# Patient Record
Sex: MALE | HISPANIC OR LATINO | Employment: FULL TIME | URBAN - METROPOLITAN AREA
[De-identification: names, ages, dates, MRNs, and addresses within clinical notes are randomized per-mention and may not be internally consistent; named-entity substitution may affect disease eponyms.]

---

## 2021-06-02 ENCOUNTER — RECORDS - HEALTHEAST (OUTPATIENT)
Dept: ADMINISTRATIVE | Facility: CLINIC | Age: 51
End: 2021-06-02

## 2021-07-27 ENCOUNTER — HOSPITAL ENCOUNTER (EMERGENCY)
Facility: HOSPITAL | Age: 51
Discharge: HOME | End: 2021-07-27
Attending: EMERGENCY MEDICINE | Admitting: EMERGENCY MEDICINE

## 2021-07-27 VITALS
SYSTOLIC BLOOD PRESSURE: 140 MMHG | OXYGEN SATURATION: 97 % | WEIGHT: 250 LBS | TEMPERATURE: 97.8 F | DIASTOLIC BLOOD PRESSURE: 86 MMHG | HEART RATE: 55 BPM | RESPIRATION RATE: 20 BRPM

## 2021-07-27 DIAGNOSIS — H81.11 BENIGN PAROXYSMAL POSITIONAL VERTIGO, RIGHT: ICD-10-CM

## 2021-07-27 PROBLEM — Z99.89 CPAP (CONTINUOUS POSITIVE AIRWAY PRESSURE) DEPENDENCE: Status: ACTIVE | Noted: 2018-03-01

## 2021-07-27 PROBLEM — E55.9 VITAMIN D DEFICIENCY: Status: ACTIVE | Noted: 2018-03-07

## 2021-07-27 LAB
ANION GAP SERPL CALCULATED.3IONS-SCNC: 9 MMOL/L (ref 5–18)
BASOPHILS # BLD AUTO: 0.1 10E3/UL (ref 0–0.2)
BASOPHILS NFR BLD AUTO: 0 %
BUN SERPL-MCNC: 10 MG/DL (ref 8–22)
CALCIUM SERPL-MCNC: 9.6 MG/DL (ref 8.5–10.5)
CHLORIDE BLD-SCNC: 106 MMOL/L (ref 98–107)
CO2 SERPL-SCNC: 25 MMOL/L (ref 22–31)
CREAT SERPL-MCNC: 0.8 MG/DL (ref 0.7–1.3)
EOSINOPHIL # BLD AUTO: 0.2 10E3/UL (ref 0–0.7)
EOSINOPHIL NFR BLD AUTO: 2 %
ERYTHROCYTE [DISTWIDTH] IN BLOOD BY AUTOMATED COUNT: 14 % (ref 10–15)
GFR SERPL CREATININE-BSD FRML MDRD: >90 ML/MIN/1.73M2
GLUCOSE BLD-MCNC: 90 MG/DL (ref 70–125)
HCT VFR BLD AUTO: 44.8 % (ref 40–53)
HGB BLD-MCNC: 15.2 G/DL (ref 13.3–17.7)
IMM GRANULOCYTES # BLD: 0.1 10E3/UL
IMM GRANULOCYTES NFR BLD: 1 %
LYMPHOCYTES # BLD AUTO: 3 10E3/UL (ref 0.8–5.3)
LYMPHOCYTES NFR BLD AUTO: 24 %
MCH RBC QN AUTO: 29.2 PG (ref 26.5–33)
MCHC RBC AUTO-ENTMCNC: 33.9 G/DL (ref 31.5–36.5)
MCV RBC AUTO: 86 FL (ref 78–100)
MONOCYTES # BLD AUTO: 0.8 10E3/UL (ref 0–1.3)
MONOCYTES NFR BLD AUTO: 7 %
NEUTROPHILS # BLD AUTO: 8.5 10E3/UL (ref 1.6–8.3)
NEUTROPHILS NFR BLD AUTO: 66 %
NRBC # BLD AUTO: 0 10E3/UL
NRBC BLD AUTO-RTO: 0 /100
PLATELET # BLD AUTO: 301 10E3/UL (ref 150–450)
POTASSIUM BLD-SCNC: 4.1 MMOL/L (ref 3.5–5)
RBC # BLD AUTO: 5.21 10E6/UL (ref 4.4–5.9)
SODIUM SERPL-SCNC: 140 MMOL/L (ref 136–145)
WBC # BLD AUTO: 12.5 10E3/UL (ref 4–11)

## 2021-07-27 PROCEDURE — 80048 BASIC METABOLIC PNL TOTAL CA: CPT | Performed by: EMERGENCY MEDICINE

## 2021-07-27 PROCEDURE — 99283 EMERGENCY DEPT VISIT LOW MDM: CPT

## 2021-07-27 PROCEDURE — 36415 COLL VENOUS BLD VENIPUNCTURE: CPT | Performed by: EMERGENCY MEDICINE

## 2021-07-27 PROCEDURE — 85025 COMPLETE CBC W/AUTO DIFF WBC: CPT | Performed by: EMERGENCY MEDICINE

## 2021-07-27 RX ORDER — MECLIZINE HCL 12.5 MG 12.5 MG/1
25 TABLET ORAL 4 TIMES DAILY PRN
Qty: 30 TABLET | Refills: 0 | Status: SHIPPED | OUTPATIENT
Start: 2021-07-27 | End: 2023-06-05

## 2021-07-27 NOTE — ED TRIAGE NOTES
Pt states history of dizziness that started 2 days ago.  Pt states he feels the room will start spinning.  Pt was seen at his primary and was told to go to ER for further evaluation.  Pt states when he tilts to the right side the dizziness gets worse.

## 2021-07-28 NOTE — DISCHARGE INSTRUCTIONS
Cleansing as needed for vertigo.  Call your primary care doctor tomorrow and have them refer you to outpatient physical therapy to learn Epley maneuver.  If you do not have a primary care doctor referral has been provided.

## 2021-07-28 NOTE — ED PROVIDER NOTES
EMERGENCY DEPARTMENT ENCOUNTER      NAME: Ramin Matute  AGE: 51 year old male  YOB: 1970  MRN: [id number]  EVALUATION DATE & TIME: 2021  7:08 PM    PCP: No primary care provider on file.    ED PROVIDER: Flaquita Humphrey MD    Chief Complaint   Patient presents with     Dizziness         FINAL IMPRESSION:  1. Benign paroxysmal positional vertigo, right          ED COURSE & MEDICAL DECISION MAKIN:16 PM Met with patient and preformed initial assessment.   7:22 PM Discussed discharge with patient.     Pertinent Labs & Imaging studies reviewed. (See chart for details)  51 year old male with history of depression, HTN, obesity who presents to the Emergency Department for evaluation of episodes of vertigo when he turns his head to the right or leans to the right since yesterday morning.  Associated nausea vomiting.  Had a similar episode 2021 that was self-limited.  He has absolutely no neurologic symptoms associated with this.  Neurologic examination is unremarkable and patient is normal.  Patient does not warrant neuroimaging or further evaluation for CVA, TIA, posterior circulation mass lesion, etc.  His symptoms are fairly classic for BPPV.  Patient given prescription for meclizine, and discharged home with PCP and physical therapy for Epley maneuver teaching.               At the conclusion of the encounter I discussed the results of all of the tests and the disposition. The questions were answered. The patient or family acknowledged understanding and was agreeable with the care plan.    MEDICATIONS GIVEN IN THE EMERGENCY:  Medications - No data to display    NEW PRESCRIPTIONS STARTED AT TODAY'S ER VISIT  Discharge Medication List as of 2021  7:59 PM      START taking these medications    Details   meclizine (ANTIVERT) 12.5 MG tablet Take 2 tablets (25 mg) by mouth 4 times daily as needed for dizziness, Disp-30 tablet, R-0, Local Print                 =================================================================    HPI    Patient information was obtained from: Patient    Use of Intrepreter: N/A       Ramin Matute is a 51 year old male with pertinent medical history of obesity and high blood pressure who presents dizziness.     Since yesterday morning, patient reports having persistent intermittent episodes of dizziness that feels like the world is moving back and forth. Patient states that episodes are onset when bending over to the right or when he looks suddenly to the right. He mentions that episodes initially lasted 30 seconds, but episode prior to ED arrival lasted 30 minutes. Patient mentions having slight right ear ringing without pain. He mentions having multiple episodes of vomiting secondary to dizziness. Patient reports taking medications for depression and high blood pressure. He denies numbness, tingling, weakness, any other pains, or any other complaints at this time.       REVIEW OF SYSTEMS  Constitutional:  Denies fever, chills, weight loss or weakness  Eyes:  No pain, discharge, redness  HENT: Positive right ear ringing. Denies sore throat, ear pain, congestion  GI:  Positive vomiting. Denies abdominal pain, diarrhea  Musculoskeletal:  Denies any new muscle/joint pain, swelling or loss of function.  Neurologic:  Positive room spinning dizziness. Denies headaches  All other systems negative unless noted in HPI.      PAST MEDICAL HISTORY:  Past Medical History:   Diagnosis Date     Depressive disorder      Hypertension      Obesity        PAST SURGICAL HISTORY:  History reviewed. No pertinent surgical history.    CURRENT MEDICATIONS:    None       ALLERGIES:  No Known Allergies    FAMILY HISTORY:  History reviewed. No pertinent family history.    SOCIAL HISTORY:  Social History     Tobacco Use     Smoking status: None   Substance Use Topics     Alcohol use: None     Drug use: None        VITALS:  Patient Vitals for the past 24 hrs:   BP  Temp Temp src Pulse Resp SpO2 Weight   07/27/21 1741 (!) 140/86 97.8  F (36.6  C) Temporal 55 20 97 % 113.4 kg (250 lb)       PHYSICAL EXAM    General Appearance: Well-appearing, well-nourished, no acute distress  Head:  Normocephalic  Eyes:  PERRL, conjunctiva/corneas clear, EOM's intact  ENT: TMs clear bilaterally membranes are moist without pallor  Neck:  Supple  Cardio:  Regular rate and rhythm  Pulm:  No respiratory distress  Abdomen: Morbidly obese  Extremities: Moves all extremities normally, normal gait  Skin:  Skin warm, dry, no rashes  Neuro:  Alert and oriented ×3, moving all extremities, no gross sensory defects. Cranial nerves 3-12 intact. 5/5 strength in upper and lower extremities.      RADIOLOGY/LABS:  Reviewed all pertinent imaging. Please see official radiology report. All pertinent labs reviewed and interpreted.    Results for orders placed or performed during the hospital encounter of 07/27/21   Basic metabolic panel   Result Value Ref Range    Sodium 140 136 - 145 mmol/L    Potassium 4.1 3.5 - 5.0 mmol/L    Chloride 106 98 - 107 mmol/L    Carbon Dioxide (CO2) 25 22 - 31 mmol/L    Anion Gap 9 5 - 18 mmol/L    Urea Nitrogen 10 8 - 22 mg/dL    Creatinine 0.80 0.70 - 1.30 mg/dL    Calcium 9.6 8.5 - 10.5 mg/dL    Glucose 90 70 - 125 mg/dL    GFR Estimate >90 >60 mL/min/1.73m2   CBC with platelets and differential   Result Value Ref Range    WBC Count 12.5 (H) 4.0 - 11.0 10e3/uL    RBC Count 5.21 4.40 - 5.90 10e6/uL    Hemoglobin 15.2 13.3 - 17.7 g/dL    Hematocrit 44.8 40.0 - 53.0 %    MCV 86 78 - 100 fL    MCH 29.2 26.5 - 33.0 pg    MCHC 33.9 31.5 - 36.5 g/dL    RDW 14.0 10.0 - 15.0 %    Platelet Count 301 150 - 450 10e3/uL    % Neutrophils 66 %    % Lymphocytes 24 %    % Monocytes 7 %    % Eosinophils 2 %    % Basophils 0 %    % Immature Granulocytes 1 %    NRBCs per 100 WBC 0 <1 /100    Absolute Neutrophils 8.5 (H) 1.6 - 8.3 10e3/uL    Absolute Lymphocytes 3.0 0.8 - 5.3 10e3/uL    Absolute  Monocytes 0.8 0.0 - 1.3 10e3/uL    Absolute Eosinophils 0.2 0.0 - 0.7 10e3/uL    Absolute Basophils 0.1 0.0 - 0.2 10e3/uL    Absolute Immature Granulocytes 0.1 (H) <=0.0 10e3/uL    Absolute NRBCs 0.0 10e3/uL       The creation of this record is based on the scribe s observations of the work being performed by Flaquita Humphrey MD and the provider s statements to them. It was created on his behalf by Washington Johnson, a trained medical scribe. This document has been checked and approved by the attending provider.    Flaquita Humphrey MD  Emergency Medicine  [facility] EMERGENCY DEPARTMENT  [facility] [facility] [id number] [phone]  Dept: [phone]       Flaquita Humphrey MD  07/27/21 9574

## 2023-06-05 ENCOUNTER — APPOINTMENT (OUTPATIENT)
Dept: MRI IMAGING | Facility: CLINIC | Age: 53
End: 2023-06-05
Attending: NURSE PRACTITIONER
Payer: COMMERCIAL

## 2023-06-05 ENCOUNTER — HOSPITAL ENCOUNTER (EMERGENCY)
Facility: CLINIC | Age: 53
Discharge: HOME | End: 2023-06-05
Attending: EMERGENCY MEDICINE | Admitting: EMERGENCY MEDICINE
Payer: COMMERCIAL

## 2023-06-05 ENCOUNTER — APPOINTMENT (OUTPATIENT)
Dept: CT IMAGING | Facility: CLINIC | Age: 53
End: 2023-06-05
Attending: EMERGENCY MEDICINE
Payer: COMMERCIAL

## 2023-06-05 VITALS
RESPIRATION RATE: 16 BRPM | WEIGHT: 256 LBS | BODY MASS INDEX: 42.65 KG/M2 | OXYGEN SATURATION: 97 % | DIASTOLIC BLOOD PRESSURE: 87 MMHG | SYSTOLIC BLOOD PRESSURE: 167 MMHG | HEART RATE: 98 BPM | HEIGHT: 65 IN | TEMPERATURE: 97.5 F

## 2023-06-05 DIAGNOSIS — H81.399 PERIPHERAL VERTIGO, UNSPECIFIED LATERALITY: ICD-10-CM

## 2023-06-05 LAB
ANION GAP SERPL CALCULATED.3IONS-SCNC: 8 MMOL/L (ref 5–18)
APTT PPP: 24 SECONDS (ref 22–38)
ATRIAL RATE - MUSE: 69 BPM
BASOPHILS # BLD AUTO: 0.1 10E3/UL (ref 0–0.2)
BASOPHILS NFR BLD AUTO: 0 %
BUN SERPL-MCNC: 12 MG/DL (ref 8–22)
CALCIUM SERPL-MCNC: 9.3 MG/DL (ref 8.5–10.5)
CHLORIDE BLD-SCNC: 101 MMOL/L (ref 98–107)
CO2 SERPL-SCNC: 30 MMOL/L (ref 22–31)
CREAT SERPL-MCNC: 0.86 MG/DL (ref 0.7–1.3)
DIASTOLIC BLOOD PRESSURE - MUSE: NORMAL MMHG
EOSINOPHIL # BLD AUTO: 0.2 10E3/UL (ref 0–0.7)
EOSINOPHIL NFR BLD AUTO: 2 %
ERYTHROCYTE [DISTWIDTH] IN BLOOD BY AUTOMATED COUNT: 14.2 % (ref 10–15)
GFR SERPL CREATININE-BSD FRML MDRD: >90 ML/MIN/1.73M2
GLUCOSE BLD-MCNC: 132 MG/DL (ref 70–125)
GLUCOSE BLDC GLUCOMTR-MCNC: 118 MG/DL (ref 70–99)
HCT VFR BLD AUTO: 42.3 % (ref 40–53)
HGB BLD-MCNC: 14.2 G/DL (ref 13.3–17.7)
IMM GRANULOCYTES # BLD: 0.1 10E3/UL
IMM GRANULOCYTES NFR BLD: 1 %
INR PPP: 1.04 (ref 0.85–1.15)
INTERPRETATION ECG - MUSE: NORMAL
LYMPHOCYTES # BLD AUTO: 2.6 10E3/UL (ref 0.8–5.3)
LYMPHOCYTES NFR BLD AUTO: 23 %
MCH RBC QN AUTO: 28.7 PG (ref 26.5–33)
MCHC RBC AUTO-ENTMCNC: 33.6 G/DL (ref 31.5–36.5)
MCV RBC AUTO: 86 FL (ref 78–100)
MONOCYTES # BLD AUTO: 0.7 10E3/UL (ref 0–1.3)
MONOCYTES NFR BLD AUTO: 6 %
NEUTROPHILS # BLD AUTO: 7.9 10E3/UL (ref 1.6–8.3)
NEUTROPHILS NFR BLD AUTO: 68 %
NRBC # BLD AUTO: 0 10E3/UL
NRBC BLD AUTO-RTO: 0 /100
P AXIS - MUSE: 49 DEGREES
PLATELET # BLD AUTO: 263 10E3/UL (ref 150–450)
POTASSIUM BLD-SCNC: 3.7 MMOL/L (ref 3.5–5)
PR INTERVAL - MUSE: 152 MS
QRS DURATION - MUSE: 104 MS
QT - MUSE: 412 MS
QTC - MUSE: 441 MS
R AXIS - MUSE: 17 DEGREES
RBC # BLD AUTO: 4.95 10E6/UL (ref 4.4–5.9)
SODIUM SERPL-SCNC: 139 MMOL/L (ref 136–145)
SYSTOLIC BLOOD PRESSURE - MUSE: NORMAL MMHG
T AXIS - MUSE: 39 DEGREES
TROPONIN I SERPL-MCNC: 0.02 NG/ML (ref 0–0.29)
VENTRICULAR RATE- MUSE: 69 BPM
WBC # BLD AUTO: 11.5 10E3/UL (ref 4–11)

## 2023-06-05 PROCEDURE — 70496 CT ANGIOGRAPHY HEAD: CPT

## 2023-06-05 PROCEDURE — 82962 GLUCOSE BLOOD TEST: CPT

## 2023-06-05 PROCEDURE — 70498 CT ANGIOGRAPHY NECK: CPT

## 2023-06-05 PROCEDURE — 85730 THROMBOPLASTIN TIME PARTIAL: CPT | Performed by: EMERGENCY MEDICINE

## 2023-06-05 PROCEDURE — 36415 COLL VENOUS BLD VENIPUNCTURE: CPT | Performed by: EMERGENCY MEDICINE

## 2023-06-05 PROCEDURE — 258N000003 HC RX IP 258 OP 636: Performed by: EMERGENCY MEDICINE

## 2023-06-05 PROCEDURE — 85610 PROTHROMBIN TIME: CPT | Performed by: EMERGENCY MEDICINE

## 2023-06-05 PROCEDURE — 84484 ASSAY OF TROPONIN QUANT: CPT | Performed by: EMERGENCY MEDICINE

## 2023-06-05 PROCEDURE — 93005 ELECTROCARDIOGRAM TRACING: CPT | Performed by: EMERGENCY MEDICINE

## 2023-06-05 PROCEDURE — 250N000011 HC RX IP 250 OP 636: Performed by: EMERGENCY MEDICINE

## 2023-06-05 PROCEDURE — 80048 BASIC METABOLIC PNL TOTAL CA: CPT | Performed by: EMERGENCY MEDICINE

## 2023-06-05 PROCEDURE — 70553 MRI BRAIN STEM W/O & W/DYE: CPT

## 2023-06-05 PROCEDURE — 255N000002 HC RX 255 OP 636: Performed by: EMERGENCY MEDICINE

## 2023-06-05 PROCEDURE — 96360 HYDRATION IV INFUSION INIT: CPT | Mod: 59 | Performed by: EMERGENCY MEDICINE

## 2023-06-05 PROCEDURE — 70450 CT HEAD/BRAIN W/O DYE: CPT

## 2023-06-05 PROCEDURE — 99285 EMERGENCY DEPT VISIT HI MDM: CPT | Mod: 25 | Performed by: EMERGENCY MEDICINE

## 2023-06-05 PROCEDURE — 96361 HYDRATE IV INFUSION ADD-ON: CPT | Performed by: EMERGENCY MEDICINE

## 2023-06-05 PROCEDURE — A9585 GADOBUTROL INJECTION: HCPCS | Performed by: EMERGENCY MEDICINE

## 2023-06-05 PROCEDURE — 85025 COMPLETE CBC W/AUTO DIFF WBC: CPT | Performed by: EMERGENCY MEDICINE

## 2023-06-05 RX ORDER — PROCHLORPERAZINE MALEATE 10 MG
5 TABLET ORAL EVERY 6 HOURS PRN
Qty: 10 TABLET | Refills: 0 | Status: SHIPPED | OUTPATIENT
Start: 2023-06-05

## 2023-06-05 RX ORDER — SODIUM CHLORIDE 9 MG/ML
INJECTION, SOLUTION INTRAVENOUS CONTINUOUS
Status: DISCONTINUED | OUTPATIENT
Start: 2023-06-05 | End: 2023-06-05 | Stop reason: HOSPADM

## 2023-06-05 RX ORDER — IOPAMIDOL 755 MG/ML
100 INJECTION, SOLUTION INTRAVASCULAR ONCE
Status: COMPLETED | OUTPATIENT
Start: 2023-06-05 | End: 2023-06-05

## 2023-06-05 RX ORDER — MECLIZINE HYDROCHLORIDE 25 MG/1
25 TABLET ORAL 3 TIMES DAILY PRN
Qty: 15 TABLET | Refills: 0 | Status: SHIPPED | OUTPATIENT
Start: 2023-06-05

## 2023-06-05 RX ORDER — GADOBUTROL 604.72 MG/ML
10 INJECTION INTRAVENOUS ONCE
Status: COMPLETED | OUTPATIENT
Start: 2023-06-05 | End: 2023-06-05

## 2023-06-05 RX ADMIN — GADOBUTROL 10 ML: 604.72 INJECTION INTRAVENOUS at 14:36

## 2023-06-05 RX ADMIN — SODIUM CHLORIDE: 9 INJECTION, SOLUTION INTRAVENOUS at 15:06

## 2023-06-05 RX ADMIN — IOPAMIDOL 75 ML: 755 INJECTION, SOLUTION INTRAVENOUS at 12:57

## 2023-06-05 RX ADMIN — SODIUM CHLORIDE 1000 ML: 9 INJECTION, SOLUTION INTRAVENOUS at 13:11

## 2023-06-05 ASSESSMENT — ACTIVITIES OF DAILY LIVING (ADL)
ADLS_ACUITY_SCORE: 33
ADLS_ACUITY_SCORE: 35

## 2023-06-05 NOTE — ED NOTES
Stroke code deescalated. Primary RN updated. Pt did endorse right ear pain for the last few months.

## 2023-06-05 NOTE — ED TRIAGE NOTES
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 Karen MD assessing pt in triage.  Angella Hughes RN.......6/5/2023 12:47 PM     Triage Assessment     Row Name 06/05/23 1245       Triage Assessment (Adult)    Airway WDL WDL       Respiratory WDL    Respiratory WDL WDL       Skin Circulation/Temperature WDL    Skin Circulation/Temperature WDL WDL       Cardiac WDL    Cardiac WDL WDL       Peripheral/Neurovascular WDL    Peripheral Neurovascular WDL WDL       Cognitive/Neuro/Behavioral WDL    Cognitive/Neuro/Behavioral WDL WDL

## 2023-06-05 NOTE — CONSULTS
"[facility]    Stroke Consult Note    Reason for Consult: Stroke Code     Chief Complaint: Dizziness and Nausea      HPI  Ramin Matute is a 52 year old male with PMHx significant for HTN and GINA on CPAP. He presents via medics with sudden onset vertigo at 1030 with associated nausea and vomiting. He reports that he had similar symptoms a couple of years ago when he had COVID. He feels generally well but tired; when asked specifically about HEENT symptoms he reports that he's had ~ 1 month of right ear fullness and occasional pain.     After CT, his symptoms improved significantly but haven't resolved. Slight increase in symptoms with movement. He was able to ambulate. He denies headache.     Imaging Findings  Head CT:   No acute intracranial process.    HEAD CTA:   1.  No large artery stenosis or occlusion. No aneurysm.     NECK CTA:  1.  No measurable carotid or vertebral artery stenosis.    Intravenous Thrombolysis  Not given due to:   - minor/isolated/quickly resolving symptoms    Endovascular Treatment  Not initiated due to absence of proximal vessel occlusion    Impression   Vertigo: peripheral etiology vs stroke     Recommendations  - MRI brain w/ and w/out contrast (ordered)  - can allow for permissive hypertension while awaiting MRI results   - can give aspirin 325 mg x 1  - if MRI shows stroke, please place IP stroke consult     Patient Follow-up     - final recommendation pending work-up    Thank you for this consult. We will follow for MRI results.      Arlen Mendoza NP  Vascular Neurology    To page me or covering stroke neurology team member, click here: [facility]  Choose \"On Call\" tab at top, then select \"NEUROLOGY/ALL SITES\" from middle drop-down box, press Enter, then look for \"stroke\" or \"telestroke\" for your site.    ______________________________________________________    Clinically Significant Risk Factors Present on Admission                       # Severe Obesity: " "Estimated body mass index is 42.6 kg/m  as calculated from the following:    Height as of this encounter: 1.651 m (5' 5\").    Weight as of this encounter: 116.1 kg (256 lb).              Past Medical History   Past Medical History:   Diagnosis Date     Depressive disorder      Hypertension      Obesity      Past Surgical History   No past surgical history on file.  Medications   Home Meds  Prior to Admission medications    Medication Sig Start Date End Date Taking? Authorizing Provider   meclizine (ANTIVERT) 12.5 MG tablet Take 2 tablets (25 mg) by mouth 4 times daily as needed for dizziness 7/27/21   Flaquita Humphrey MD       Scheduled Meds    sodium chloride 0.9%  1,000 mL Intravenous Once       Infusion Meds    sodium chloride         PRN Meds      Allergies   No Known Allergies  Family History   No family history on file.  Social History        Review of Systems   The 10 point Review of Systems is negative other than noted in the HPI or here.        PHYSICAL EXAMINATION  Temp:  [97.5  F (36.4  C)] 97.5  F (36.4  C)  Pulse:  [63] 63  Resp:  [18] 18  BP: (186)/(89) 186/89  SpO2:  [100 %] 100 %     Neuro Exam  Mental Status:  alert, oriented x 3, follows commands, speech clear and fluent, naming and repetition normal  Cranial Nerves:  visual fields intact (tested by nurse), EOMI with normal smooth pursuit, facial sensation intact and symmetric (tested by nurse), facial movements symmetric, hearing not formally tested but intact to conversation, no dysarthria, shoulder shrug equal bilaterally, tongue protrusion midline  Motor:  no abnormal movements, able to move all limbs antigravity spontaneously with no signs of hemiparesis observed, no pronator drift  Reflexes:  unable to test (telestroke)  Sensory:  light touch sensation intact and symmetric throughout upper and lower extremities (assessed by nurse), no extinction on double simultaneous stimulation (assessed by nurse)  Coordination:  normal finger-to-nose and " heel-to-shin bilaterally without dysmetria  Station/Gait:  normal width, turn, arm swing    Dysphagia Screen  Per Nursing    Stroke Scales    NIHSS  1a. Level of Consciousness 0-->Alert, keenly responsive   1b. LOC Questions 0-->Answers both questions correctly   1c. LOC Commands 0-->Performs both tasks correctly   2.   Best Gaze 0-->Normal   3.   Visual 0-->No visual loss   4.   Facial Palsy 0-->Normal symmetrical movements   5a. Motor Arm, Left 0-->No drift, limb holds 90 (or 45) degrees for full 10 secs   5b. Motor Arm, Right 0-->No drift, limb holds 90 (or 45) degrees for full 10 secs   6a. Motor Leg, Left 0-->No drift, leg holds 30 degree position for full 5 secs   6b. Motor Leg, right 0-->No drift, leg holds 30 degree position for full 5 secs   7.   Limb Ataxia 0-->Absent   8.   Sensory 0-->Normal, no sensory loss   9.   Best Language 0-->No aphasia, normal   10. Dysarthria 0-->Normal   11. Extinction and Inattention  0-->No abnormality   Total 0 (06/05/23 1316)       Modified [place] Score (Pre-morbid)  0 - No symptoms.    Imaging  I personally reviewed all imaging; relevant findings per HPI.     Lab Results Data   CBC  No results for input(s): WBC, RBC, HGB, HCT, PLT in the last 168 hours.  Basic Metabolic Panel    No results for input(s): NA, POTASSIUM, CHLORIDE, CO2, BUN, CR, GLC, SHAYNA in the last 168 hours.  Liver Panel  No results for input(s): PROTTOTAL, ALBUMIN, BILITOTAL, ALKPHOS, AST, ALT, BILIDIRECT in the last 168 hours.  INR  No lab results found.   Lipid Profile  No lab results found.  A1C  No lab results found.  Troponin  No results for input(s): CTROPT, TROPONINIS, TROPONINI, GHTROP in the last 168 hours.       Stroke Code Data Data   Stroke Code Data  (for stroke code with tele)  Stroke code activated 06/05/23   1245   First stroke provider response 06/05/23   1249   Video start time 06/05/23   1310   Video end time 06/05/23   1322   Last known normal 06/05/23   1030   Time of discovery  (or  onset of symptoms)  06/05/23   1030   Head CT read by Stroke Neuro Dr/Provider 06/05/23   1309   Was stroke code de-escalated? Yes 06/05/23 1323           Telestroke Service Details  Type of service telemedicine diagnostic assessment of acute neurological changes   Reason telemedicine is appropriate patient requires assessment with a specialist for diagnosis and treatment of neurological symptoms   Mode of transmission secure interactive audio and video communication per [employer]   Originating site (patient location) [facility]    Distant site (provider location) [facility]       I personally examined and evaluated the patient today. At the time of my evaluation and management the patient was in critical condition today due to acute neurological changes. I personally managed review of labs and images and tele neuro exam. Key decisions made today included: MRI. I spent a total of 40 minutes providing critical care services, evaluating the patient, directing care and reviewing laboratory values and radiologic reports.

## 2023-06-05 NOTE — ED PROVIDER NOTES
EMERGENCY DEPARTMENT ENCOUNTER            IMPRESSION:  Peripheral vertigo        MEDICAL DECISION MAKING:  It was my pleasure to provide care for Ramin Matute who presented by ambulance for for evaluation of dizziness which she further characterized as vertigo.  No chest pain or other acute neurologic symptoms.    On my exam patient is pleasant and cooperative.  No evidence of distress.  Vital signs show slightly elevated blood pressure otherwise normal.  Physical exam notable for no acute neurologic findings..     IV fluid administered for symptom relief.  Patient's symptoms improved.     EKG normal sinus rhythm without ischemia or arrhythmia    Significant laboratory findings include normal chemistry and CBC    CT imaging of the head is normal.  Imaging independently reviewed and agree with radiologist findings.     ED evaluation is consistent with peripheral vertigo.    I spoke with on-call stroke neurology.  Stroke code de-escalated.  MRI of the brain recommended.      Patient was reevaluated and results were discussed.       Prior to making a final disposition on this patient the results of patient's tests and other diagnostic studies were discussed with the patient. All questions were answered. Patient expressed understanding of the plan and was amenable.   Return precautions and follow-up were discussed.     =================================================================  CHIEF COMPLAINT:  Chief Complaint   Patient presents with     Dizziness     Nausea         HPI  Ramin Matute is a 52 year old male with a history of CPAP dependence who presents to the ED by EMS for evaluation of nausea and dizziness    While dropping off some paint supplies today the patient endorsed the sudden onset of room spinning dizziness at approximately 10:30 AM. He also endorses nausea and one episode of vomiting from the dizziness. The patient is currently diaphoretic and feeling fatigued. He reports that he had breakfast this  "morning as usual.      The patient denies any known medical problems. He denies any history of diabetes but notes that his last A1C showed that he was close to prediabetes. The patient denies any chest pain, or any other complaints.     REVIEW OF SYSTEMS   Constitutional: Does not report chills, unintentional weight loss or fatigue   Eyes: Does not report visual changes or discharge    HENT: Does not report sore throat, ear pain or neck pain  Respiratory: Does not report cough or shortness of breath    Cardiovascular: Does not report chest pain, palpitations or leg swelling  GI: Does not report abdominal pain, nausea, vomiting, or dark, bloody stools.    : Does not report hematuria, dysuria, or flank pain  Musculoskeletal: Does not report any new musculoskeletal pain or new muscle/joint pains  Skin: Does not report rash or wound  Neurologic: Does not report current headache, new weakness, focal weakness, or sensory changes        Remainder of systems reviewed, unless noted in HPI all others negative.      PAST MEDICAL HISTORY:  Past Medical History:   Diagnosis Date     Depressive disorder      Hypertension      Obesity        PAST SURGICAL HISTORY:  No past surgical history on file.      CURRENT MEDICATIONS:    meclizine (ANTIVERT) 12.5 MG tablet        ALLERGIES:  No Known Allergies    FAMILY HISTORY:  No family history on file.    SOCIAL HISTORY:   Social History     Socioeconomic History     Marital status:        PHYSICAL EXAM:    BP (!) 148/71   Pulse 75   Temp 97.5  F (36.4  C) (Oral)   Resp 18   Ht 1.651 m (5' 5\")   Wt 116.1 kg (256 lb)   SpO2 98%   BMI 42.60 kg/m      Constitutional: Awake, alert, no evidence of distress  Head: Normocephalic, atraumatic.  ENT: Mucous membranes are moist.  No pallor.   Eyes: Pupils are reactive.  No discoloration.  No nystagmus  Neck: No lymphadenopathy, no stridor, supple, no soft tissue swelling  Chest: No tenderness   Respiratory: Respirations even, " unlabored. Lungs clear to ascultation bilaterally, in no acute respiratory distress.  Cardiovascular: Regular rate and rhythm.  Good overall perfusion.  Upper and lower extremity pulses are equal.  GI: Abdomen soft, non-tender to palpation.  No guarding or rebound. Bowel sounds present throughout.   Back: No CVA tenderness.    Musculoskeletal: Moves all 4 extremities equally, full function and capacity no peripheral edema.   Integument: Warm, dry. No rash. No bruising or petechiae.  Neurologic: Alert & oriented x 3. Normal speech. Grossly normal motor and sensory function. No focal deficits noted.  NIHSS = 0 he has a steady gait and station  Psychiatric: Normal mood and affect.  Appropriate judgement.    ED COURSE:  12:45 PM Met with patient for initial interview and exam. Discussed initial plan for care for their stay in the emergency department.  12:49 PM I spoke with stroke neurology.  1:10 PM The patient reports that he is feeling better.  1:23 PM I spoke with stroke neurology.      Medical Decision Making    History:    Supplemental history from: Paramedics     External Record(s) reviewed: External medical records including care everywhere reviewed    Work Up:    EKG, laboratory and imaging studies as ordered were independently reviewed by the provider    Broad differential diagnosis considered for stroke    The patient's presentation was of high complexity.     External consultation:    Discussion of management with another provider: Stroke neurology    Complicating factors:    Patient has a complicated past medical history including: N/A    Care affected by social determinants of health: N/A    Disposition involved shared decision-making with the patient.  Patient otherwise to continue outpatient medications as prescribed.       LAB:  Laboratory results were independently reviewed and interpreted  Results for orders placed or performed during the hospital encounter of 06/05/23   CT Head w/o Contrast     Impression    IMPRESSION:  1.  No acute intracranial process.   CTA Head Neck with Contrast    Impression    IMPRESSION:   HEAD CTA:   1.  No large artery stenosis or occlusion. No aneurysm.    NECK CTA:  1.  No measurable carotid or vertebral artery stenosis.    The results were communicated to Dr. Jones at 1:27 PM on 06/05/2023.   Basic metabolic panel   Result Value Ref Range    Sodium 139 136 - 145 mmol/L    Potassium 3.7 3.5 - 5.0 mmol/L    Chloride 101 98 - 107 mmol/L    Carbon Dioxide (CO2) 30 22 - 31 mmol/L    Anion Gap 8 5 - 18 mmol/L    Urea Nitrogen 12 8 - 22 mg/dL    Creatinine 0.86 0.70 - 1.30 mg/dL    Calcium 9.3 8.5 - 10.5 mg/dL    Glucose 132 (H) 70 - 125 mg/dL    GFR Estimate >90 >60 mL/min/1.73m2   Result Value Ref Range    INR 1.04 0.85 - 1.15   Partial thromboplastin time   Result Value Ref Range    aPTT 24 22 - 38 Seconds   Result Value Ref Range    Troponin I 0.02 0.00 - 0.29 ng/mL   Glucose by meter   Result Value Ref Range    GLUCOSE BY METER POCT 118 (H) 70 - 99 mg/dL   CBC with platelets and differential   Result Value Ref Range    WBC Count 11.5 (H) 4.0 - 11.0 10e3/uL    RBC Count 4.95 4.40 - 5.90 10e6/uL    Hemoglobin 14.2 13.3 - 17.7 g/dL    Hematocrit 42.3 40.0 - 53.0 %    MCV 86 78 - 100 fL    MCH 28.7 26.5 - 33.0 pg    MCHC 33.6 31.5 - 36.5 g/dL    RDW 14.2 10.0 - 15.0 %    Platelet Count 263 150 - 450 10e3/uL    % Neutrophils 68 %    % Lymphocytes 23 %    % Monocytes 6 %    % Eosinophils 2 %    % Basophils 0 %    % Immature Granulocytes 1 %    NRBCs per 100 WBC 0 <1 /100    Absolute Neutrophils 7.9 1.6 - 8.3 10e3/uL    Absolute Lymphocytes 2.6 0.8 - 5.3 10e3/uL    Absolute Monocytes 0.7 0.0 - 1.3 10e3/uL    Absolute Eosinophils 0.2 0.0 - 0.7 10e3/uL    Absolute Basophils 0.1 0.0 - 0.2 10e3/uL    Absolute Immature Granulocytes 0.1 <=0.4 10e3/uL    Absolute NRBCs 0.0 10e3/uL         RADIOLOGY:  Radiology reports were independently reviewed and interpreted  CTA Head Neck with Contrast    Final Result   IMPRESSION:    HEAD CTA:    1.  No large artery stenosis or occlusion. No aneurysm.      NECK CTA:   1.  No measurable carotid or vertebral artery stenosis.      The results were communicated to Dr. Jones at 1:27 PM on 06/05/2023.      CT Head w/o Contrast   Final Result   IMPRESSION:   1.  No acute intracranial process.      MR Brain w/o & w Contrast    (Results Pending)        EKG:    Performed at: 12:46    Impression: Sinus rhythm    Rate: 69  Rhythm: Sinus rhythm  Axis: None  ND Interval: 152  QRS Interval: 104  QTc Interval: 441  ST Changes: None  Comparison: When compare with ECG of 03-AUG-2010, QT has lengthened.     I have independently reviewed and interpreted the EKG(s) documented above.    MEDICATIONS GIVEN IN THE EMERGENCY:  Medications   0.9% sodium chloride BOLUS (1,000 mLs Intravenous $New Bag 6/5/23 1311)     Followed by   sodium chloride 0.9% infusion (has no administration in time range)   iopamidol (ISOVUE-370) solution 100 mL (75 mLs Intravenous $Given 6/5/23 1257)   gadobutrol (GADAVIST) injection 10 mL (10 mLs Intravenous $Given 6/5/23 1436)           NEW PRESCRIPTIONS STARTED AT TODAY'S ER VISIT:  New Prescriptions    No medications on file                FINAL DIAGNOSIS:    ICD-10-CM    1. Peripheral vertigo, unspecified laterality  H81.399                  NAME: Ramin Matute  AGE: 52 year old male  YOB: 1970  MRN: [id number]  EVALUATION DATE & TIME: No admission date for patient encounter.    PCP: No Ref-Primary, Physician    ED PROVIDER: Heriberto Jones M.D.      Tangela SCHNEIDER, am serving as a scribe to document services personally performed by Dr. Heriberto Jones based on my observation and the provider's statements to me. I, Heriberto Jones MD attest that Tangela Hong is acting in a scribe capacity, has observed my performance of the services and has documented them in accordance with my direction.    Heriberto Jones M.D.  Emergency Medicine  [facility]  [facility] EMERGENCY ROOM  [address] [place] [address]  [phone]  Dept: [phone]  6/5/2023         Heriberto Jones MD  06/05/23 1453

## 2024-08-11 ENCOUNTER — HOSPITAL ENCOUNTER (EMERGENCY)
Facility: CLINIC | Age: 54
Discharge: HOME | End: 2024-08-11
Attending: EMERGENCY MEDICINE | Admitting: EMERGENCY MEDICINE
Payer: COMMERCIAL

## 2024-08-11 ENCOUNTER — APPOINTMENT (OUTPATIENT)
Dept: RADIOLOGY | Facility: CLINIC | Age: 54
End: 2024-08-11
Attending: EMERGENCY MEDICINE
Payer: COMMERCIAL

## 2024-08-11 VITALS
HEIGHT: 64 IN | OXYGEN SATURATION: 96 % | WEIGHT: 255 LBS | HEART RATE: 53 BPM | TEMPERATURE: 98.2 F | DIASTOLIC BLOOD PRESSURE: 86 MMHG | BODY MASS INDEX: 43.54 KG/M2 | SYSTOLIC BLOOD PRESSURE: 143 MMHG | RESPIRATION RATE: 29 BRPM

## 2024-08-11 DIAGNOSIS — R00.2 PALPITATIONS: ICD-10-CM

## 2024-08-11 LAB
ANION GAP SERPL CALCULATED.3IONS-SCNC: 9 MMOL/L (ref 7–15)
ATRIAL RATE - MUSE: 67 BPM
BASOPHILS # BLD AUTO: 0.1 10E3/UL (ref 0–0.2)
BASOPHILS NFR BLD AUTO: 1 %
BUN SERPL-MCNC: 12.7 MG/DL (ref 6–20)
CALCIUM SERPL-MCNC: 9.5 MG/DL (ref 8.8–10.4)
CHLORIDE SERPL-SCNC: 103 MMOL/L (ref 98–107)
CREAT SERPL-MCNC: 0.83 MG/DL (ref 0.67–1.17)
DIASTOLIC BLOOD PRESSURE - MUSE: 99 MMHG
EGFRCR SERPLBLD CKD-EPI 2021: >90 ML/MIN/1.73M2
EOSINOPHIL # BLD AUTO: 0.4 10E3/UL (ref 0–0.7)
EOSINOPHIL NFR BLD AUTO: 4 %
ERYTHROCYTE [DISTWIDTH] IN BLOOD BY AUTOMATED COUNT: 14.1 % (ref 10–15)
GLUCOSE SERPL-MCNC: 104 MG/DL (ref 70–99)
HCO3 SERPL-SCNC: 27 MMOL/L (ref 22–29)
HCT VFR BLD AUTO: 43.2 % (ref 40–53)
HGB BLD-MCNC: 14.8 G/DL (ref 13.3–17.7)
HOLD SPECIMEN: NORMAL
IMM GRANULOCYTES # BLD: 0.1 10E3/UL
IMM GRANULOCYTES NFR BLD: 1 %
INTERPRETATION ECG - MUSE: NORMAL
LYMPHOCYTES # BLD AUTO: 2.9 10E3/UL (ref 0.8–5.3)
LYMPHOCYTES NFR BLD AUTO: 34 %
MAGNESIUM SERPL-MCNC: 2.1 MG/DL (ref 1.7–2.3)
MCH RBC QN AUTO: 29.2 PG (ref 26.5–33)
MCHC RBC AUTO-ENTMCNC: 34.3 G/DL (ref 31.5–36.5)
MCV RBC AUTO: 85 FL (ref 78–100)
MONOCYTES # BLD AUTO: 0.7 10E3/UL (ref 0–1.3)
MONOCYTES NFR BLD AUTO: 8 %
NEUTROPHILS # BLD AUTO: 4.4 10E3/UL (ref 1.6–8.3)
NEUTROPHILS NFR BLD AUTO: 52 %
NRBC # BLD AUTO: 0 10E3/UL
NRBC BLD AUTO-RTO: 0 /100
P AXIS - MUSE: 22 DEGREES
PLATELET # BLD AUTO: 277 10E3/UL (ref 150–450)
POTASSIUM SERPL-SCNC: 3.7 MMOL/L (ref 3.4–5.3)
PR INTERVAL - MUSE: 156 MS
QRS DURATION - MUSE: 98 MS
QT - MUSE: 390 MS
QTC - MUSE: 412 MS
R AXIS - MUSE: 20 DEGREES
RBC # BLD AUTO: 5.06 10E6/UL (ref 4.4–5.9)
SODIUM SERPL-SCNC: 139 MMOL/L (ref 135–145)
SYSTOLIC BLOOD PRESSURE - MUSE: 182 MMHG
T AXIS - MUSE: 15 DEGREES
T4 FREE SERPL-MCNC: 1.04 NG/DL (ref 0.9–1.7)
TROPONIN T SERPL HS-MCNC: 7 NG/L
TROPONIN T SERPL HS-MCNC: 7 NG/L
TSH SERPL DL<=0.005 MIU/L-ACNC: 4.92 UIU/ML (ref 0.3–4.2)
VENTRICULAR RATE- MUSE: 67 BPM
WBC # BLD AUTO: 8.4 10E3/UL (ref 4–11)

## 2024-08-11 PROCEDURE — 71045 X-RAY EXAM CHEST 1 VIEW: CPT

## 2024-08-11 PROCEDURE — 84484 ASSAY OF TROPONIN QUANT: CPT | Performed by: EMERGENCY MEDICINE

## 2024-08-11 PROCEDURE — 93005 ELECTROCARDIOGRAM TRACING: CPT | Performed by: EMERGENCY MEDICINE

## 2024-08-11 PROCEDURE — 84439 ASSAY OF FREE THYROXINE: CPT | Performed by: EMERGENCY MEDICINE

## 2024-08-11 PROCEDURE — 99285 EMERGENCY DEPT VISIT HI MDM: CPT | Mod: 25 | Performed by: EMERGENCY MEDICINE

## 2024-08-11 PROCEDURE — 84443 ASSAY THYROID STIM HORMONE: CPT | Performed by: EMERGENCY MEDICINE

## 2024-08-11 PROCEDURE — 36415 COLL VENOUS BLD VENIPUNCTURE: CPT | Performed by: EMERGENCY MEDICINE

## 2024-08-11 PROCEDURE — 85025 COMPLETE CBC W/AUTO DIFF WBC: CPT | Performed by: EMERGENCY MEDICINE

## 2024-08-11 PROCEDURE — 80048 BASIC METABOLIC PNL TOTAL CA: CPT | Performed by: EMERGENCY MEDICINE

## 2024-08-11 PROCEDURE — 83735 ASSAY OF MAGNESIUM: CPT | Performed by: EMERGENCY MEDICINE

## 2024-08-11 ASSESSMENT — ENCOUNTER SYMPTOMS
PALPITATIONS: 1
DIZZINESS: 0
LIGHT-HEADEDNESS: 0
NECK PAIN: 0
BACK PAIN: 0
SHORTNESS OF BREATH: 1

## 2024-08-11 ASSESSMENT — ACTIVITIES OF DAILY LIVING (ADL)
ADLS_ACUITY_SCORE: 35

## 2024-08-11 NOTE — ED PROVIDER NOTES
EMERGENCY DEPARTMENT ENCOUNTER      NAME: Ramin Matute  AGE: 54 year old male  YOB: 1970  MRN: [id number]  EVALUATION DATE & TIME: 8/11/2024  2:30 AM    PCP: Aravind Graf    ED PROVIDER: Alivia Augustine M.D.      Chief Complaint   Patient presents with    Palpitations    Numbness         FINAL IMPRESSION:  1. Palpitations        MEDICAL DECISION MAKING:    Pertinent Labs & Imaging studies reviewed. (See chart for details)  ED Course as of 08/11/24 0420   Sun Aug 11, 2024   0258 Afebrile.  Vital signs here with hypertension, otherwise unremarkable.  Patient is coming in with feeling of palpitations.  Woke up from sleep, felt his heart was racing.  Initially had some tingling in his left fingers.  No dizziness or lightheadedness.  No chest pain or discomfort.  No neck pain.  No shortness of breath.  No nausea or vomiting.  States that he had something like this similar about 14 years ago, he was diagnosed with sleep apnea at that time.  Has been using his CPAP.  Currently symptoms have resolved.    EKG for patient here shows sinus rhythm at a rate of 67.  No ST elevations or depressions.  No ectopy.  Intervals are intact.  Normal axis.  QTc is 412, QRS 98, .  As compared to his previous EKG from June 5, 2023 no significant change found.   0301 Outside records reviewed 6/18/2024 family medicine doctors office visit.   0301 EKG here is nonischemic.  His exam is unremarkable.  Plan for checking labs, electrolyte levels, magnesium, TSH, chest x-ray.  If negative anticipate home with follow-up with primary care for possible Holter monitor.       So far initial labs are infection here unremarkable but with initial troponin at 7.  Given symptoms started just prior to arrival will get delta troponin at 5.  X-ray unremarkable.  EKG nonischemic.  Will be signed out to dayshift pending follow-up of delta troponin    Medical Decision Making  Obtained supplemental history:Supplemental history obtained?:  Documented in chart  Reviewed external records: External records reviewed?: Documented in chart  Care impacted by chronic illness:Hypertension  Care significantly affected by social determinants of health:Access to Medical Care  Did you consider but not order tests?: Work up considered but not performed and documented in chart, if applicable  Did you interpret images independently?: Independent interpretation of ECG and images noted in documentation, when applicable.  Consultation discussion with other provider:Did you involve another provider (consultant, , pharmacy, etc.)?: No  Discharge. No recommendations on prescription strength medication(s). I considered admission, but discharged the patient after share decision making conversation.      Critical care: 0 minutes excluding separately billable procedures.  Includes bedside management, time reviewing test results, review of records, discussing the case with staff, documenting the medical record and time spent with family members (or surrogate decision makers) discussing specific treatment issues.          ED COURSE:  2:42 AM I met with the patient, obtained history, performed an initial exam, and discussed options and plan for diagnostics and treatment here in the ED.    The importance of close follow up was discussed. We reviewed warning signs and symptoms, and I instructed Mr. Matute to return to the emergency department immediately if he develops any new or worsening symptoms. I provided additional verbal discharge instructions. Mr. Matute expressed understanding and agreement with this plan of care, his questions were answered, and he was discharged in stable condition.     MEDICATIONS GIVEN IN THE EMERGENCY:  Medications - No data to display    NEW PRESCRIPTIONS STARTED AT TODAY'S ER VISIT:  New Prescriptions    No medications on file          =================================================================    HPI    Patient information was obtained from:  Patient    Use of : N/A       Ramin Matute is a 54 year old male with a history of HTN who presents to the ED via EMS for the evaluation of palpitations.    Patient reports he woke up tonight less than an hour ago. After going to the bathroom, he developed palpitations and felt slightly short of breath. Patient reports a history of this 14 years ago but has not had any similar episodes since. Currently, he states that he feels back to baseline. Otherwise, he denies any dizziness, lightheadedness, chest pain, neck pain, back pain. No recent illnesses. Patient denies any history of heart disease. He does use a CPAP daily. No other complaints at this time.    REVIEW OF SYSTEMS   Review of Systems   Respiratory:  Positive for shortness of breath.    Cardiovascular:  Positive for palpitations. Negative for chest pain.   Musculoskeletal:  Negative for back pain and neck pain.   Neurological:  Negative for dizziness and light-headedness.   All other systems reviewed and are negative.    PAST MEDICAL HISTORY:  Past Medical History:   Diagnosis Date    Depressive disorder     Hypertension     Obesity        PAST SURGICAL HISTORY:  History reviewed. No pertinent surgical history.    CURRENT MEDICATIONS:    No current facility-administered medications for this encounter.    Current Outpatient Medications:     meclizine (ANTIVERT) 25 MG tablet, Take 1 tablet (25 mg) by mouth 3 times daily as needed for dizziness, Disp: 15 tablet, Rfl: 0    prochlorperazine (COMPAZINE) 10 MG tablet, Take 0.5 tablets (5 mg) by mouth every 6 hours as needed for nausea or vomiting, Disp: 10 tablet, Rfl: 0    ALLERGIES:  No Known Allergies    FAMILY HISTORY:  History reviewed. No pertinent family history.    SOCIAL HISTORY:   Social History     Socioeconomic History    Marital status:      Social Determinants of Health     Financial Resource Strain: Low Risk  (1/22/2024)    Received from [facility]  "   Financial Resource Strain     Difficulty of Paying Living Expenses: 3   Food Insecurity: No Food Insecurity (1/22/2024)    Received from [facility]    Food Insecurity     Worried About Running Out of Food in the Last Year: 1   Transportation Needs: No Transportation Needs (1/22/2024)    Received from [facility]    Transportation Needs     Lack of Transportation (Medical): 1   Social Connections: Socially Integrated (1/22/2024)    Received from [facility]    Social Connections     Frequency of Communication with Friends and Family: 0   Housing Stability: Low Risk  (1/22/2024)    Received from [facility]    Housing Stability     Unable to Pay for Housing in the Last Year: 1       PHYSICAL EXAM:    Vitals: BP (!) 182/99   Pulse 72   Temp 98.2  F (36.8  C) (Oral)   Resp 17   Ht 1.626 m (5' 4\")   Wt 115.7 kg (255 lb)   SpO2 91%   BMI 43.77 kg/m     General:. Alert and interactive, comfortable appearing.  HENT: Oropharynx without erythema or exudates. MMM.  TMs clear bilaterally.  Eyes: Pupils mid-sized and equally reactive.   Neck: Full AROM.  No midline tenderness to palpation.  Cardiovascular: Regular rate and rhythm. Peripheral pulses 2+ bilaterally.  Chest/Pulmonary: Normal work of breathing. Lung sounds clear and equal throughout, no wheezes or crackles. No chest wall tenderness or deformities.  Abdomen: Soft, nondistended. Nontender without guarding or rebound.  Back/Spine: No CVA or midline tenderness.  Extremities: Normal ROM of all major joints. No lower extremity edema.   Skin: Warm and dry. Normal skin color.   Neuro: Speech clear. CNs grossly intact. Moves all extremities appropriately. Strength and sensation grossly intact to all extremities.   Psych: Normal affect/mood, cooperative, memory appropriate.     LAB:  All pertinent labs reviewed and interpreted.  Labs " Ordered and Resulted from Time of ED Arrival to Time of ED Departure   BASIC METABOLIC PANEL - Abnormal       Result Value    Sodium 139      Potassium 3.7      Chloride 103      Carbon Dioxide (CO2) 27      Anion Gap 9      Urea Nitrogen 12.7      Creatinine 0.83      GFR Estimate >90      Calcium 9.5      Glucose 104 (*)    TSH WITH FREE T4 REFLEX - Abnormal    TSH 4.92 (*)    MAGNESIUM - Normal    Magnesium 2.1     TROPONIN T, HIGH SENSITIVITY - Normal    Troponin T, High Sensitivity 7     T4 FREE - Normal    Free T4 1.04     CBC WITH PLATELETS AND DIFFERENTIAL    WBC Count 8.4      RBC Count 5.06      Hemoglobin 14.8      Hematocrit 43.2      MCV 85      MCH 29.2      MCHC 34.3      RDW 14.1      Platelet Count 277      % Neutrophils 52      % Lymphocytes 34      % Monocytes 8      % Eosinophils 4      % Basophils 1      % Immature Granulocytes 1      NRBCs per 100 WBC 0      Absolute Neutrophils 4.4      Absolute Lymphocytes 2.9      Absolute Monocytes 0.7      Absolute Eosinophils 0.4      Absolute Basophils 0.1      Absolute Immature Granulocytes 0.1      Absolute NRBCs 0.0     TROPONIN T, HIGH SENSITIVITY       RADIOLOGY:  XR Chest Port 1 View   Final Result   IMPRESSION: No evidence of active cardiopulmonary disease.           EKG:  See MDM  I have independently reviewed and interpreted the EKG(s) documented above.           I, Donna Crawford, am serving as a scribe to document services personally performed by Dr. Alivia Augustine  based on my observation and the provider's statements to me. I, Alivia Augustine MD attest that Donna Crawford is acting in a scribe capacity, has observed my performance of the services and has documented them in accordance with my direction.      Alivia Augustine M.D.  Emergency Medicine  [facility] EMERGENCY ROOM  [address] [id number] [phone]  Dept: [phone]     Alivia Augustine MD  08/11/24 4170

## 2024-08-11 NOTE — ED TRIAGE NOTES
"Pt reports waking up from sleep less than hour ago, having palpitations. Reports \"my heart was racing and tingling on my left fingers\". Denies SOB/CP. EMS arrived and HR was 80. In the ER HR 67.      Triage Assessment (Adult)       Row Name 08/11/24 0234          Triage Assessment    Airway WDL WDL        Respiratory WDL    Respiratory WDL WDL        Cardiac WDL    Cardiac WDL X  palpitations        Peripheral/Neurovascular WDL    Peripheral Neurovascular WDL WDL        Cognitive/Neuro/Behavioral WDL    Cognitive/Neuro/Behavioral WDL WDL                     "

## 2024-08-11 NOTE — DISCHARGE INSTRUCTIONS
Thankfully your workup here was normal.  You should follow-up with your primary care doctor for further testing regarding palpitations if continues.

## 2024-08-11 NOTE — ED NOTES
"EMERGENCY DEPARTMENT SIGN OUT NOTE        ED COURSE AND MEDICAL DECISION MAKING  Patient was signed out to me by Dr Sobia Augusitne at 4:35 AM    In brief, Ramin Matute is a 54 year old male who initially presented with palpitations and numbness.     \"Patient reports he woke up tonight less than an hour ago. After going to the bathroom, he developed palpitations and felt slightly short of breath. Patient reports a history of this 14 years ago but has not had any similar episodes since. Currently, he states that he feels back to baseline. Otherwise, he denies any dizziness, lightheadedness, chest pain, neck pain, back pain. No recent illnesses. Patient denies any history of heart disease. He does use a CPAP daily. No other complaints at this time. \"    At time of sign out, disposition was pending repeat troponin at 5 AM.  5:52 AM patient's repeat troponin is unchanged and unremarkable.  Patient will be plan for discharge as per previous discussion.    FINAL IMPRESSION    1. Palpitations        ED MEDS  Medications - No data to display    LAB  Labs Ordered and Resulted from Time of ED Arrival to Time of ED Departure   BASIC METABOLIC PANEL - Abnormal       Result Value    Sodium 139      Potassium 3.7      Chloride 103      Carbon Dioxide (CO2) 27      Anion Gap 9      Urea Nitrogen 12.7      Creatinine 0.83      GFR Estimate >90      Calcium 9.5      Glucose 104 (*)    TSH WITH FREE T4 REFLEX - Abnormal    TSH 4.92 (*)    MAGNESIUM - Normal    Magnesium 2.1     TROPONIN T, HIGH SENSITIVITY - Normal    Troponin T, High Sensitivity 7     T4 FREE - Normal    Free T4 1.04     CBC WITH PLATELETS AND DIFFERENTIAL    WBC Count 8.4      RBC Count 5.06      Hemoglobin 14.8      Hematocrit 43.2      MCV 85      MCH 29.2      MCHC 34.3      RDW 14.1      Platelet Count 277      % Neutrophils 52      % Lymphocytes 34      % Monocytes 8      % Eosinophils 4      % Basophils 1      % Immature Granulocytes 1      NRBCs per 100 WBC 0   "    Absolute Neutrophils 4.4      Absolute Lymphocytes 2.9      Absolute Monocytes 0.7      Absolute Eosinophils 0.4      Absolute Basophils 0.1      Absolute Immature Granulocytes 0.1      Absolute NRBCs 0.0     TROPONIN T, HIGH SENSITIVITY       EKG  EKG for patient here shows sinus rhythm at a rate of 67. No ST elevations or depressions. No ectopy. Intervals are intact. Normal axis. QTc is 412, QRS 98, . As compared to his previous EKG from June 5, 2023 no significant change found.     RADIOLOGY    XR Chest Port 1 View   Final Result   IMPRESSION: No evidence of active cardiopulmonary disease.           DISCHARGE MEDS  New Prescriptions    No medications on file       Igor Shepherd MD  [facility] EMERGENCY ROOM  [facility] access [facility] [facility] [address] [phone]         Doyle Shepherd MD  08/11/24 3250

## 2024-08-11 NOTE — ED NOTES
Pt reporting having no questions. AOX4. Encouraged to limit coffee intake past 3pm and reduce work load (stress) to help improve symptoms. Ambulates appropriately. Denying CP/SOB.